# Patient Record
Sex: FEMALE | Race: BLACK OR AFRICAN AMERICAN | NOT HISPANIC OR LATINO
[De-identification: names, ages, dates, MRNs, and addresses within clinical notes are randomized per-mention and may not be internally consistent; named-entity substitution may affect disease eponyms.]

---

## 2021-08-16 PROBLEM — Z00.00 ENCOUNTER FOR PREVENTIVE HEALTH EXAMINATION: Status: ACTIVE | Noted: 2021-08-16

## 2021-08-17 ENCOUNTER — APPOINTMENT (OUTPATIENT)
Dept: MAMMOGRAPHY | Facility: CLINIC | Age: 66
End: 2021-08-17
Payer: COMMERCIAL

## 2021-08-17 PROCEDURE — 77067 SCR MAMMO BI INCL CAD: CPT

## 2021-08-17 PROCEDURE — 77063 BREAST TOMOSYNTHESIS BI: CPT

## 2021-09-20 ENCOUNTER — APPOINTMENT (OUTPATIENT)
Dept: MAMMOGRAPHY | Facility: CLINIC | Age: 66
End: 2021-09-20

## 2021-09-20 ENCOUNTER — APPOINTMENT (OUTPATIENT)
Dept: ULTRASOUND IMAGING | Facility: CLINIC | Age: 66
End: 2021-09-20

## 2022-02-15 ENCOUNTER — APPOINTMENT (OUTPATIENT)
Dept: MRI IMAGING | Facility: CLINIC | Age: 67
End: 2022-02-15
Payer: COMMERCIAL

## 2022-02-15 ENCOUNTER — RESULT REVIEW (OUTPATIENT)
Age: 67
End: 2022-02-15

## 2022-02-15 PROCEDURE — 72148 MRI LUMBAR SPINE W/O DYE: CPT

## 2022-04-13 VITALS — HEIGHT: 64 IN | WEIGHT: 216 LBS | BODY MASS INDEX: 36.88 KG/M2

## 2022-04-25 ENCOUNTER — APPOINTMENT (OUTPATIENT)
Dept: ORTHOPEDIC SURGERY | Facility: CLINIC | Age: 67
End: 2022-04-25
Payer: COMMERCIAL

## 2022-04-25 VITALS — WEIGHT: 216 LBS | HEIGHT: 64 IN | BODY MASS INDEX: 36.88 KG/M2

## 2022-04-25 DIAGNOSIS — Z86.79 PERSONAL HISTORY OF OTHER DISEASES OF THE CIRCULATORY SYSTEM: ICD-10-CM

## 2022-04-25 DIAGNOSIS — M51.37 OTHER INTERVERTEBRAL DISC DEGENERATION, LUMBOSACRAL REGION: ICD-10-CM

## 2022-04-25 DIAGNOSIS — M51.36 OTHER INTERVERTEBRAL DISC DEGENERATION, LUMBAR REGION: ICD-10-CM

## 2022-04-25 DIAGNOSIS — M43.16 SPONDYLOLISTHESIS, LUMBAR REGION: ICD-10-CM

## 2022-04-25 DIAGNOSIS — M47.817 SPONDYLOSIS W/OUT MYELOPATHY OR RADICULOPATHY, LUMBOSACRAL REGION: ICD-10-CM

## 2022-04-25 DIAGNOSIS — M54.16 RADICULOPATHY, LUMBAR REGION: ICD-10-CM

## 2022-04-25 DIAGNOSIS — M48.061 SPINAL STENOSIS, LUMBAR REGION WITHOUT NEUROGENIC CLAUDICATION: ICD-10-CM

## 2022-04-25 DIAGNOSIS — Z78.9 OTHER SPECIFIED HEALTH STATUS: ICD-10-CM

## 2022-04-25 PROCEDURE — 99214 OFFICE O/P EST MOD 30 MIN: CPT

## 2022-04-25 RX ORDER — ALBUTEROL SULFATE
POWDER (GRAM) MISCELLANEOUS
Refills: 0 | Status: ACTIVE | COMMUNITY

## 2022-04-25 RX ORDER — LOSARTAN POTASSIUM 100 MG/1
100 TABLET, FILM COATED ORAL
Refills: 0 | Status: ACTIVE | COMMUNITY

## 2022-04-25 RX ORDER — METHOCARBAMOL 750 MG/1
750 TABLET, FILM COATED ORAL 3 TIMES DAILY
Qty: 90 | Refills: 0 | Status: ACTIVE | COMMUNITY
Start: 2022-04-25 | End: 1900-01-01

## 2022-04-25 RX ORDER — SERTRALINE HYDROCHLORIDE 50 MG/1
50 TABLET, FILM COATED ORAL
Refills: 0 | Status: ACTIVE | COMMUNITY

## 2022-04-25 RX ORDER — METOPROLOL SUCCINATE 50 MG/1
50 TABLET, EXTENDED RELEASE ORAL
Refills: 0 | Status: ACTIVE | COMMUNITY

## 2022-04-25 NOTE — DATA REVIEWED
[MRI] : MRI [Lumbar Spine] : lumbar spine [Report was reviewed and noted in the chart] : The report was reviewed and noted in the chart [I independently reviewed and interpreted images and report] : I independently reviewed and interpreted images and report [FreeTextEntry1] : Lumbar:\par Multilevel lumbar spondylosis \par Spondylolisthesis L4-5 with moderate to severe stenosis \par HNP L5-S1\par Bilateral facet arthropathy L4-S1

## 2022-04-25 NOTE — ASSESSMENT
[FreeTextEntry1] : Recommend: - NSAID - Heating pad - Muscle relaxer - Core strengthening exercise - Hamstring stretching exercise Patient is given back rehabilitation exercise book. \par \par Patient will begin physical therapy. \par \par Follow up in 2 months

## 2022-04-25 NOTE — HISTORY OF PRESENT ILLNESS
[6] : 6 [8] : 8 [Meds] : meds [Sitting] : sitting [Standing] : standing [Lying in bed] : lying in bed [de-identified] : Follow up lumbar spine MRI Results at [facility]. Pain is worse when laying down. Takes Ibuprofen PRN.

## 2024-05-21 ENCOUNTER — APPOINTMENT (OUTPATIENT)
Dept: MAMMOGRAPHY | Facility: CLINIC | Age: 69
End: 2024-05-21
Payer: COMMERCIAL

## 2024-05-21 PROCEDURE — 77063 BREAST TOMOSYNTHESIS BI: CPT

## 2024-05-21 PROCEDURE — 77067 SCR MAMMO BI INCL CAD: CPT

## 2025-09-16 ENCOUNTER — APPOINTMENT (OUTPATIENT)
Dept: MAMMOGRAPHY | Facility: CLINIC | Age: 70
End: 2025-09-16
Payer: MEDICARE

## 2025-09-16 PROCEDURE — 77067 SCR MAMMO BI INCL CAD: CPT

## 2025-09-16 PROCEDURE — 77063 BREAST TOMOSYNTHESIS BI: CPT
